# Patient Record
Sex: FEMALE | Race: WHITE | NOT HISPANIC OR LATINO | Employment: FULL TIME | URBAN - METROPOLITAN AREA
[De-identification: names, ages, dates, MRNs, and addresses within clinical notes are randomized per-mention and may not be internally consistent; named-entity substitution may affect disease eponyms.]

---
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
## 2022-05-23 ENCOUNTER — OFFICE VISIT (OUTPATIENT)
Dept: ORTHOPEDIC SURGERY | Facility: CLINIC | Age: 22
End: 2022-05-23

## 2022-05-23 VITALS — HEIGHT: 67 IN | BODY MASS INDEX: 32.02 KG/M2 | WEIGHT: 204 LBS

## 2022-05-23 DIAGNOSIS — M25.511 RIGHT SHOULDER PAIN, UNSPECIFIED CHRONICITY: Primary | ICD-10-CM

## 2022-05-23 DIAGNOSIS — M25.521 RIGHT ELBOW PAIN: ICD-10-CM

## 2022-05-23 PROCEDURE — 99203 OFFICE O/P NEW LOW 30 MIN: CPT | Performed by: ORTHOPAEDIC SURGERY

## 2022-05-23 NOTE — PROGRESS NOTES
"Chief Complaint  Initial Evaluation of the Right Shoulder and Initial Evaluation of the Right Elbow     Subjective      Anitra Garcia presents to [facility] for an evaluation of right shoulder and right elbow. Patient plays softball. She states pain in the shoulder and elbow when she throws. She states pain in the shoulder that radiates down the elbow. She states pain began in February. She hasn’t had this kind of pain before. No specific injury. Sometimes elbow pain and shoulder pain feels seperate.      Allergies   Allergen Reactions   • Vancomycin Itching and Swelling     Given vanc and zosyn June 2018, experienced subsequent skin redness and questionable hives suspicious for mild Megan's.   6/28/18 LS: Also had presumed septic shock at the time and has assoc hypotension.           Social History     Socioeconomic History   • Marital status: Single   Tobacco Use   • Smoking status: Never Smoker   • Smokeless tobacco: Never Used        Review of Systems     Objective   Vital Signs:   Ht 170.2 cm (67\")   Wt 92.5 kg (204 lb)   BMI 31.95 kg/m²       Physical Exam  Constitutional:       Appearance: Normal appearance. Patient is well-developed and normal weight.   HENT:      Head: Normocephalic.      Right Ear: Hearing and external ear normal.      Left Ear: Hearing and external ear normal.      Nose: Nose normal.   Eyes:      Conjunctiva/sclera: Conjunctivae normal.   Cardiovascular:      Rate and Rhythm: Normal rate.   Pulmonary:      Effort: Pulmonary effort is normal.      Breath sounds: No wheezing or rales.   Abdominal:      Palpations: Abdomen is soft.      Tenderness: There is no abdominal tenderness.   Musculoskeletal:      Cervical back: Normal range of motion.   Skin:     Findings: No rash.   Neurological:      Mental Status: Patient  is alert and oriented to person, place, and time.   Psychiatric:         Mood and Affect: Mood and affect normal.         Judgment: " Judgment normal.       Ortho Exam      RIGHT ELBOW AND SHOULDER: Intact wrist range of motion. Patient able to wiggle fingers and form a fist. Sensation grossly intact. Neurovascular intact.  Radial pulse 2+, ulnar pulse 2+. Full forward elevation. Abduction is full. IR to upper lumbar spine. No swelling. Full elbow flexion and extension. Some pain with apprehension testing.       Procedures        Imaging Results (Most Recent)     Procedure Component Value Units Date/Time    XR Scapula Right [[id number]] Resulted: 05/23/22 1613     Updated: 05/23/22 1614    XR Elbow 2 View Right [[id number]] Resulted: 05/23/22 1613     Updated: 05/23/22 1614           Result Review :     X-Ray Report:  Right shoulder(s) X-Ray  Indication: Evaluation of right shoulder pain   AP and Lateral view(s)  Findings: No acute fractures or dislocations.   Prior studies available for comparison: no     X-Ray Report:  Right elbow(s) X-Ray  Indication: Evaluation of right elbow   AP and Lateral view(s)  Findings: No acute osseous abnormalities.   Prior studies available for comparison: no     Assessment and Plan     Diagnoses and all orders for this visit:    1. Right shoulder pain, unspecified chronicity (Primary)  -     XR Scapula Right    2. Right elbow pain  -     XR Elbow 2 View Right        Discussed obtaining an Mri arthrogram of the right shoulder. She agrees with this plan.     Call or return if worsening symptoms.    Follow Up     After MRI      Patient was given instructions and counseling regarding her condition or for health maintenance advice. Please see specific information pulled into the AVS if appropriate.     Scribed for Marvin León MD by Florencia Perez.  05/23/22   16:10 EDT    I have personally performed the services described in this document as scribed by the above individual and it is both accurate and complete. Marvin León MD 05/23/22

## 2022-06-23 ENCOUNTER — HOSPITAL ENCOUNTER (OUTPATIENT)
Dept: MRI IMAGING | Facility: HOSPITAL | Age: 22
Discharge: HOME | End: 2022-06-23

## 2022-06-23 ENCOUNTER — HOSPITAL ENCOUNTER (OUTPATIENT)
Dept: INTERVENTIONAL RADIOLOGY/VASCULAR | Facility: HOSPITAL | Age: 22
Discharge: HOME | End: 2022-06-23

## 2022-06-23 DIAGNOSIS — M25.511 RIGHT SHOULDER PAIN, UNSPECIFIED CHRONICITY: ICD-10-CM

## 2022-06-23 PROCEDURE — 73040 CONTRAST X-RAY OF SHOULDER: CPT

## 2022-06-23 PROCEDURE — 0 GADOBENATE DIMEGLUMINE 529 MG/ML SOLUTION: Performed by: ORTHOPAEDIC SURGERY

## 2022-06-23 PROCEDURE — 25010000002 IOPAMIDOL 61 % SOLUTION: Performed by: ORTHOPAEDIC SURGERY

## 2022-06-23 PROCEDURE — 77002 NEEDLE LOCALIZATION BY XRAY: CPT

## 2022-06-23 PROCEDURE — 73222 MRI JOINT UPR EXTREM W/DYE: CPT

## 2022-06-23 PROCEDURE — A9577 INJ MULTIHANCE: HCPCS | Performed by: ORTHOPAEDIC SURGERY

## 2022-06-23 RX ORDER — LIDOCAINE HYDROCHLORIDE 20 MG/ML
20 INJECTION, SOLUTION INFILTRATION; PERINEURAL ONCE
Status: COMPLETED | OUTPATIENT
Start: 2022-06-23 | End: 2022-06-23

## 2022-06-23 RX ADMIN — IOPAMIDOL 5 ML: 612 INJECTION, SOLUTION INTRATHECAL at 09:50

## 2022-06-23 RX ADMIN — LIDOCAINE HYDROCHLORIDE 10 ML: 20 INJECTION, SOLUTION INFILTRATION; PERINEURAL at 09:50

## 2022-06-23 RX ADMIN — SODIUM BICARBONATE 1 ML: 84 INJECTION, SOLUTION INTRAVENOUS at 09:50

## 2022-06-23 RX ADMIN — GADOBENATE DIMEGLUMINE 0.1 ML: 529 INJECTION, SOLUTION INTRAVENOUS at 09:50

## 2022-06-27 ENCOUNTER — OFFICE VISIT (OUTPATIENT)
Dept: ORTHOPEDIC SURGERY | Facility: CLINIC | Age: 22
End: 2022-06-27

## 2022-06-27 VITALS — HEIGHT: 67 IN | OXYGEN SATURATION: 97 % | WEIGHT: 212.2 LBS | BODY MASS INDEX: 33.3 KG/M2 | HEART RATE: 76 BPM

## 2022-06-27 DIAGNOSIS — M75.101 TEAR OF RIGHT ROTATOR CUFF, UNSPECIFIED TEAR EXTENT, UNSPECIFIED WHETHER TRAUMATIC: ICD-10-CM

## 2022-06-27 DIAGNOSIS — M25.511 ACUTE PAIN OF RIGHT SHOULDER: Primary | ICD-10-CM

## 2022-06-27 PROCEDURE — 99213 OFFICE O/P EST LOW 20 MIN: CPT | Performed by: ORTHOPAEDIC SURGERY

## 2022-06-27 NOTE — PROGRESS NOTES
"Chief Complaint  Follow-up of the Right Shoulder     Subjective      Anitra Garcia presents to [facility] for follow up evaluation of the right shoulder. The patient recently had an MRI Arthrogram and is here today for those results. To review, Patient plays softball. She states pain in the shoulder and elbow when she throws. She states pain in the shoulder that radiates down the elbow. She states pain began in February. She hasn’t had this kind of pain before. No specific injury. Sometimes elbow pain and shoulder pain feels seperate.  She has been resting her shoulder.     Allergies   Allergen Reactions   • Vancomycin Itching and Swelling     Given vanc and zosyn June 2018, experienced subsequent skin redness and questionable hives suspicious for mild Megan's.   6/28/18 LS: Also had presumed septic shock at the time and has assoc hypotension.           Social History     Socioeconomic History   • Marital status: Single   Tobacco Use   • Smoking status: Never Smoker   • Smokeless tobacco: Never Used        Review of Systems     Objective   Vital Signs:   Pulse 76   Ht 170.2 cm (67\")   Wt 96.3 kg (212 lb 3.2 oz)   SpO2 97%   BMI 33.24 kg/m²       Physical Exam  Constitutional:       Appearance: Normal appearance. Patient is well-developed and normal weight.   HENT:      Head: Normocephalic.      Right Ear: Hearing and external ear normal.      Left Ear: Hearing and external ear normal.      Nose: Nose normal.   Eyes:      Conjunctiva/sclera: Conjunctivae normal.   Cardiovascular:      Rate and Rhythm: Normal rate.   Pulmonary:      Effort: Pulmonary effort is normal.      Breath sounds: No wheezing or rales.   Abdominal:      Palpations: Abdomen is soft.      Tenderness: There is no abdominal tenderness.   Musculoskeletal:      Cervical back: Normal range of motion.   Skin:     Findings: No rash.   Neurological:      Mental Status: Patient  is alert and oriented to person, " place, and time.   Psychiatric:         Mood and Affect: Mood and affect normal.         Judgment: Judgment normal.       Ortho Exam      RIGHT ELBOW AND SHOULDER: Intact wrist range of motion. Patient able to wiggle fingers and form a fist. Sensation grossly intact. Neurovascular intact.  Radial pulse 2+, ulnar pulse 2+. Full forward elevation. Abduction is full. IR to upper lumbar spine. No swelling. Full elbow flexion and extension. Some pain with apprehension testing      Procedures        Imaging Results (Most Recent)     None           Result Review :       MRI Shoulder Right Arthrogram    Result Date: 6/23/2022  Narrative: PROCEDURE: MRI SHOULDER RIGHT ARTHROGRAM  COMPARISON: None  INDICATIONS: ALL OVER RIGHT SHOULDER PAIN WITH LIMITED ROM. SOFTBALL INJURY.      TECHNIQUE: A variety of imaging planes and parameters were utilized for visualization of suspected pathology.  Images were performed after intra-articular injection of a mixture of non-ionic contrast and 0.1 cc of gadolinium contrast material.  The injection procedure is described in a separate report.   FINDINGS:  No fracture or malalignment is seen.  Marrow signal appears normal.  The acromioclavicular joint appears normal.  No AC joint effusion is seen.  A trace amount of fluid is noted in the subdeltoid/subacromial bursa.  A small amount of contrast is noted in the distal infra spinatus tendon at the footprint consistent with a small partial thickness articular surface tear.  There is a tiny partial thickness articular surface tear in the subscapularis tendon.  The rotator cuff otherwise appears unremarkable.  No muscle body atrophy is seen.  The biceps long head tendon and its attachment to the superior labrum are intact.  No labral tear is seen.  The glenohumeral ligaments are all intact.  No loose body is seen.  Cartilage in the glenohumeral joint is intact.      Impression:   1. Tiny articular surface tears of the distal infra spinatus and  subscapularis tendons     iRck Stoner M.D.       Electronically Signed and Approved By: Rick Stoner M.D. on 6/23/2022 at 13:44             FL Arthrogram Shoulder Right    Result Date: 6/23/2022  Narrative: PROCEDURE: FL ARTHROGRAM SHOULDER RIGHT  COMPARISON: None  INDICATIONS: RIGHT SHOULDER PAIN. 4 FLUORO IMAGES. 3mGy. FLUORO TIME 0.1 MINUTES. ISOVUE M 300-1CC. 0.9% SODIUM CHLORIDE/0.1 MULTIHANCE/ISOVUE M 300-14CC  CONSENT:   Prior to the procedure, the risks, benefits and alternatives were thoroughly explained.  This included the risk of bleeding, infection, and injury to associated structures.  Also the risk of allergic reaction was explained and the possibility of doing the procedure without intrarticular contrast.  The patient expressed understanding and wished to continue.  Informed written consent was obtained.   PROCEDURE:   The skin overlying the anterior aspect of the right shoulder joint was prepped and draped in normal sterile fashion.  Lidocaine was injected along the anticipated tract of the needle.  A 22 gauge spinal needle was advanced into the right glenohumeral joint under fluoroscopic guidance.  14 cc of mixture containing 13 cc sterile saline, 1 cc iodinated contrast and 0.1 cc gadolinium was injected.  The needle was withdrawn. The patient demonstrated no complication and was transferred to MRI.   Less than 0.1 minutes fluoroscopy time was utilized.       Impression:  Successful fluoroscopically guided injection of a dilute mixture of gadolinium into the right shoulder without evidence of complication.  Please refer to separate MRI report.   BETTY BETANCOURT MD       Electronically Signed and Approved By: BETTY BETANCOURT MD on 6/23/2022 at 11:08                      Assessment and Plan     Diagnoses and all orders for this visit:    1. Acute pain of right shoulder (Primary)    2. Tear of right rotator cuff, unspecified tear extent, unspecified whether traumatic        Discussed the  treatment plan with the patient.  I reviewed the MRI with the patient. Order for physical therapy given today. Plan to continue resting the shoulder.     Call or return if worsening symptoms.    Follow Up     6 weeks      Patient was given instructions and counseling regarding her condition or for health maintenance advice. Please see specific information pulled into the AVS if appropriate.     Scribed for Marvin León MD by Jennifer Brantley.  06/27/22   16:08 EDT      I have personally performed the services described in this document as scribed by the above individual and it is both accurate and complete. Marvin León MD 06/27/22

## 2022-09-13 ENCOUNTER — TELEPHONE (OUTPATIENT)
Dept: ORTHOPEDIC SURGERY | Facility: CLINIC | Age: 22
End: 2022-09-13

## 2022-09-13 DIAGNOSIS — M25.521 RIGHT ELBOW PAIN: ICD-10-CM

## 2022-09-13 DIAGNOSIS — M75.101 TEAR OF RIGHT ROTATOR CUFF, UNSPECIFIED TEAR EXTENT, UNSPECIFIED WHETHER TRAUMATIC: Primary | ICD-10-CM

## 2022-09-13 DIAGNOSIS — M25.511 ACUTE PAIN OF RIGHT SHOULDER: ICD-10-CM

## 2022-09-13 DIAGNOSIS — M25.511 RIGHT SHOULDER PAIN, UNSPECIFIED CHRONICITY: ICD-10-CM

## 2022-09-13 NOTE — TELEPHONE ENCOUNTER
Caller: PATIENT     Relationship: SELF     Best call back number: [phone]    What orders are you requesting (i.e. lab or imaging): PHYSICAL THERAPY OF RIGHT SHOULDER.     Where will you receive your lab/imaging services: [facility] [place] -FAX#- [phone]     Additional notes: PT. SAW DR. GUTIERREZ IN June FOR HER RIGHT SHOULDER.  SHE GOES TO SCHOOL IN [facility], AND WANTS TO DO HER PHYSICAL THERAPY [facility].  PLEASE FAX ORDER TO ABOVE NUMBER.   PLEASE CALL PT. TO LET HER KNOW.

## 2023-06-08 ENCOUNTER — TRANSCRIBE ORDERS (OUTPATIENT)
Dept: ADMINISTRATIVE | Facility: HOSPITAL | Age: 23
End: 2023-06-08
Payer: COMMERCIAL

## 2023-06-08 DIAGNOSIS — M75.21 BICIPITAL TENDINITIS OF RIGHT SHOULDER: Primary | ICD-10-CM

## 2025-07-22 ENCOUNTER — OFFICE VISIT (OUTPATIENT)
Dept: OBSTETRICS AND GYNECOLOGY | Age: 25
End: 2025-07-22
Payer: COMMERCIAL

## 2025-07-22 VITALS
WEIGHT: 233 LBS | SYSTOLIC BLOOD PRESSURE: 131 MMHG | HEIGHT: 67 IN | DIASTOLIC BLOOD PRESSURE: 95 MMHG | BODY MASS INDEX: 36.57 KG/M2 | HEART RATE: 91 BPM

## 2025-07-22 DIAGNOSIS — Z34.80 SUPERVISION OF OTHER NORMAL PREGNANCY, ANTEPARTUM: Primary | ICD-10-CM

## 2025-07-22 DIAGNOSIS — R03.0 ELEVATED BP WITHOUT DIAGNOSIS OF HYPERTENSION: ICD-10-CM

## 2025-07-22 PROBLEM — M75.101 TEAR OF RIGHT ROTATOR CUFF: Status: RESOLVED | Noted: 2022-06-27 | Resolved: 2025-07-22

## 2025-07-22 PROBLEM — M25.511 ACUTE PAIN OF RIGHT SHOULDER: Status: RESOLVED | Noted: 2022-06-27 | Resolved: 2025-07-22

## 2025-07-22 LAB
ABO GROUP BLD: NORMAL
AMPHET+METHAMPHET UR QL: NEGATIVE
AMPHETAMINES UR QL: NEGATIVE
B-HCG UR QL: POSITIVE
BARBITURATES UR QL SCN: NEGATIVE
BASOPHILS # BLD AUTO: 0.04 10*3/MM3 (ref 0–0.2)
BASOPHILS NFR BLD AUTO: 0.4 % (ref 0–1.5)
BENZODIAZ UR QL SCN: NEGATIVE
BLD GP AB SCN SERPL QL: NEGATIVE
BUPRENORPHINE SERPL-MCNC: NEGATIVE NG/ML
C TRACH DNA SPEC QL NAA+PROBE: NOT DETECTED
CANNABINOIDS SERPL QL: NEGATIVE
COCAINE UR QL: NEGATIVE
CREAT UR-MCNC: 144.1 MG/DL
DEPRECATED RDW RBC AUTO: 37.2 FL (ref 37–54)
EOSINOPHIL # BLD AUTO: 0.11 10*3/MM3 (ref 0–0.4)
EOSINOPHIL NFR BLD AUTO: 1 % (ref 0.3–6.2)
ERYTHROCYTE [DISTWIDTH] IN BLOOD BY AUTOMATED COUNT: 12.1 % (ref 12.3–15.4)
EXPIRATION DATE: ABNORMAL
FENTANYL UR-MCNC: NEGATIVE NG/ML
HBA1C MFR BLD: 5 % (ref 4.8–5.6)
HBV SURFACE AG SERPL QL IA: NORMAL
HCT VFR BLD AUTO: 39 % (ref 34–46.6)
HCV AB SER QL: NORMAL
HGB BLD-MCNC: 12.9 G/DL (ref 12–15.9)
HIV 1+2 AB+HIV1 P24 AG SERPL QL IA: NORMAL
IMM GRANULOCYTES # BLD AUTO: 0.04 10*3/MM3 (ref 0–0.05)
IMM GRANULOCYTES NFR BLD AUTO: 0.4 % (ref 0–0.5)
INTERNAL NEGATIVE CONTROL: ABNORMAL
INTERNAL POSITIVE CONTROL: ABNORMAL
LYMPHOCYTES # BLD AUTO: 2.73 10*3/MM3 (ref 0.7–3.1)
LYMPHOCYTES NFR BLD AUTO: 24 % (ref 19.6–45.3)
Lab: ABNORMAL
MCH RBC QN AUTO: 28.4 PG (ref 26.6–33)
MCHC RBC AUTO-ENTMCNC: 33.1 G/DL (ref 31.5–35.7)
MCV RBC AUTO: 85.7 FL (ref 79–97)
METHADONE UR QL SCN: NEGATIVE
MONOCYTES # BLD AUTO: 0.56 10*3/MM3 (ref 0.1–0.9)
MONOCYTES NFR BLD AUTO: 4.9 % (ref 5–12)
N GONORRHOEA RRNA SPEC QL NAA+PROBE: NOT DETECTED
NEUTROPHILS NFR BLD AUTO: 69.3 % (ref 42.7–76)
NEUTROPHILS NFR BLD AUTO: 7.91 10*3/MM3 (ref 1.7–7)
NRBC BLD AUTO-RTO: 0 /100 WBC (ref 0–0.2)
OPIATES UR QL: NEGATIVE
OXYCODONE UR QL SCN: NEGATIVE
PCP UR QL SCN: NEGATIVE
PLATELET # BLD AUTO: 259 10*3/MM3 (ref 140–450)
PMV BLD AUTO: 10.9 FL (ref 6–12)
PROT ?TM UR-MCNC: 10.7 MG/DL
PROT/CREAT UR: 0.07 MG/G{CREAT}
RBC # BLD AUTO: 4.55 10*6/MM3 (ref 3.77–5.28)
RH BLD: POSITIVE
TRICYCLICS UR QL SCN: NEGATIVE
WBC NRBC COR # BLD AUTO: 11.39 10*3/MM3 (ref 3.4–10.8)

## 2025-07-22 PROCEDURE — 87591 N.GONORRHOEAE DNA AMP PROB: CPT | Performed by: OBSTETRICS & GYNECOLOGY

## 2025-07-22 PROCEDURE — 80081 OBSTETRIC PANEL INC HIV TSTG: CPT | Performed by: OBSTETRICS & GYNECOLOGY

## 2025-07-22 PROCEDURE — 82570 ASSAY OF URINE CREATININE: CPT | Performed by: OBSTETRICS & GYNECOLOGY

## 2025-07-22 PROCEDURE — 80053 COMPREHEN METABOLIC PANEL: CPT | Performed by: OBSTETRICS & GYNECOLOGY

## 2025-07-22 PROCEDURE — 87086 URINE CULTURE/COLONY COUNT: CPT | Performed by: OBSTETRICS & GYNECOLOGY

## 2025-07-22 PROCEDURE — 80307 DRUG TEST PRSMV CHEM ANLYZR: CPT | Performed by: OBSTETRICS & GYNECOLOGY

## 2025-07-22 PROCEDURE — 86803 HEPATITIS C AB TEST: CPT | Performed by: OBSTETRICS & GYNECOLOGY

## 2025-07-22 PROCEDURE — 83020 HEMOGLOBIN ELECTROPHORESIS: CPT | Performed by: OBSTETRICS & GYNECOLOGY

## 2025-07-22 PROCEDURE — 83036 HEMOGLOBIN GLYCOSYLATED A1C: CPT | Performed by: OBSTETRICS & GYNECOLOGY

## 2025-07-22 PROCEDURE — 87491 CHLMYD TRACH DNA AMP PROBE: CPT | Performed by: OBSTETRICS & GYNECOLOGY

## 2025-07-22 PROCEDURE — 84156 ASSAY OF PROTEIN URINE: CPT | Performed by: OBSTETRICS & GYNECOLOGY

## 2025-07-22 RX ORDER — PRENATAL VIT/IRON FUM/FOLIC AC 27MG-0.8MG
TABLET ORAL DAILY
COMMUNITY

## 2025-07-22 NOTE — ASSESSMENT & PLAN NOTE
Elevated BP at new OB, repeat still had lower number in 90s  Per patient has white coat syndrome  Baseline labs obtained  Told pt to check BPS BID

## 2025-07-22 NOTE — PROGRESS NOTES
OBSTETRIC HISTORY AND PHYSICAL     Subjective:  Anitra Garcia is a 24 y.o.  at 11w5d  here for her new OB visit. Patient pregnancy is dated by LMP, limited bedside US today shows +FHTS and CRL around 10w6d. Official US ordered. Patient's pregnancy is complicated by nulliparity, possibly chronic HTN.   She is taking her prenatal vitamins.Reports no loss of fluid or vaginal bleeding.No hx of genetic, bleeding, endocrine, chromosome disorder in both patient and partner. No history of multiple gestations, congenital anomalies or mental retardation.    FOB involvement: yes   Pediatrician: undecided  Breast/Bottle: breast   Epidural: undecided   Discussed adequate water intake, food guidelines/weight gain, limit caffiene to less than 200mg daily.   Discussed food, activities to avoid. Discussed seatbelt safety.   Reviewed safe meds in pregnancy handout.  Taking PNV: yes   Smoking cessation needed: no     Reviewed and updated:  OBHx, GYNHx (STDs), PMHx, Medications, Allergies, PSHx, Social Hx, Preventative Hx (PAP), Hx of abuse/safe environment, Vaccine Hx including hx of chickenpox or vaccine, Genetic Hx (pt, FOB, both families).        OB History    Para Term  AB Living   1        SAB IAB Ectopic Molar Multiple Live Births              # Outcome Date GA Lbr Virgilio/2nd Weight Sex Type Anes PTL Lv   1 Current              History reviewed. No pertinent past medical history.  Past Surgical History:   Procedure Laterality Date    WISDOM TOOTH EXTRACTION  21     Family History   Problem Relation Age of Onset    Hypertension Father     Breast cancer Maternal Aunt     Diabetes Paternal Uncle     Uterine cancer Neg Hx     Ovarian cancer Neg Hx     Colon cancer Neg Hx      Allergies   Allergen Reactions    Vancomycin Itching and Swelling     Given vanc and zosyn 2018, experienced subsequent skin redness and questionable hives suspicious for mild Megan's.   18 LS: Also had presumed septic  shock at the time and has assoc hypotension.        Social History     Socioeconomic History    Marital status:    Tobacco Use    Smoking status: Never    Smokeless tobacco: Never   Vaping Use    Vaping status: Never Used   Substance and Sexual Activity    Alcohol use: Never    Drug use: Never    Sexual activity: Yes     Partners: Male     Birth control/protection: None           ROS:  General ROS: negative for - chills or fatigue  Respiratory ROS: negative for - cough or hemoptysis  Cardiovascular ROS: negative for - chest pain or dyspnea on exertion  Gastrointestinal ROS: negative for - abdominal pain or appetite loss  Musculoskeletal ROS: negative for - gait disturbance or joint pain  Neurological ROS: negative for - behavioral changes or bowel and bladder control changes  Dermatological ROS: negative for rashes or lesions     Objective:  Physical Exam:   Vitals:    07/22/25 0838   BP: 131/95   Pulse: 91       General appearance - alert, well appearing, and in no distress  Mental status - alert, oriented to person, place, and time  Neck- Supple.  No nodularity or enlargement.  Heart- Regular rate and rhythm without murmur, gallop or rub.  Lungs- Clear to auscultation bilaterally, negative W/R/R  Breasts- Deferred to annual  Abdomen- Soft, Gravid uterus, non-tender  Extremeties: Normal ROM, Negative swelling or cyanosis  Neurological: Gait normal, Negative tingling or loss of sensation     Pelvic exam: Decline, plan pap smear PP    Counseling:   Nutrition discussed, calories, activity/exercise in pregnancy  Discussed dietary restrictions/safety food preparation in pregnancy  Reviewed what to expect prenatal visits, office providers (female and male) and covering [facility]  Appropriate trimester precautions provided, N/V, vag bleeding, cramping  Questions answered  Discussed healthy weight gain.  Also discussed increased water intake, 200mg of caffeine daily, exercise and healthy eating habits.     Encouraged patient to consider breastfeeding. Discussed that it is recommended by the CDC and WHO that women exclusively breastfeed for the first 6 months and continue to breastfeed up to one year. Discussed the health benefits of breastfeeding, including increased immune systems in infants, reduced risk of food allergies, and reduced risk of chronic disease as an adult. Maternal benefits include more PP weight loss, reduced risk of PP depression, breast/ovarian cancer risk reduced.     ASSESSMENT/PLAN:   Diagnoses and all orders for this visit:    1. Supervision of other normal pregnancy, antepartum (Primary)  -     POC Pregnancy, Urine  -     OB Panel With HIV and RPR  -     Hemoglobinopathy Fractionation Cascade  -     Hemoglobin A1c  -     Chlamydia trachomatis, Neisseria gonorrhoeae, PCR - Urine, Urine, Random Void  -     Urine Culture - Urine, Urine, Clean Catch  -     Urine Drug Screen - Urine, Clean Catch  -     Yifan Panorama Prenatal Test: Chromosomes 13, 18, 21, X & Y: Triploidy 22Q.11.2 Deletion - Blood,  -     Yifan Horizon 2(CF & SMA) - Blood,  -     US Ob < 14 Weeks Single or First Gestation; Future  -     US Ob 14 + Weeks Single or First Gestation; Future    2. Elevated BP without diagnosis of hypertension  Assessment & Plan:  Elevated BP at new OB, repeat still had lower number in 90s  Per patient has white coat syndrome  Baseline labs obtained  Told pt to check BPS BID     Orders:  -     Comprehensive Metabolic Panel  -     Protein / Creatinine Ratio, Urine - Urine, Clean Catch        Problems Addressed this Visit       Supervision of other normal pregnancy, antepartum - Primary    Relevant Medications    Prenatal Vit-Fe Fumarate-FA (prenatal vitamin 27-0.8) 27-0.8 MG tablet tablet    Other Relevant Orders    POC Pregnancy, Urine (Completed)    OB Panel With HIV and RPR    Hemoglobinopathy Fractionation Cascade    Hemoglobin A1c    Chlamydia trachomatis, Neisseria gonorrhoeae, PCR - Urine,  Urine, Random Void    Urine Culture - Urine, Urine, Clean Catch    Urine Drug Screen - Urine, Clean Catch    Yifan Panorama Prenatal Test: Chromosomes 13, 18, 21, X & Y: Triploidy 22Q.11.2 Deletion - Blood,    Yifan Horizon 2(CF & SMA) - Blood,    US Ob < 14 Weeks Single or First Gestation    US Ob 14 + Weeks Single or First Gestation    Elevated BP without diagnosis of hypertension    Elevated BP at new OB, repeat still had lower number in 90s  Per patient has white coat syndrome  Baseline labs obtained  Told pt to check BPS BID           Other Visit Diagnoses         Elevated blood pressure reading        Relevant Orders    Comprehensive Metabolic Panel    Protein / Creatinine Ratio, Urine - Urine, Clean Catch          Diagnoses         Codes Comments      Supervision of other normal pregnancy, antepartum    -  Primary ICD-10-CM: Z34.80  ICD-9-CM: V22.1       Elevated blood pressure reading     ICD-10-CM: R03.0  ICD-9-CM: 796.2       Elevated BP without diagnosis of hypertension     ICD-10-CM: R03.0  ICD-9-CM: 796.2                     Return in about 4 weeks (around 8/19/2025) for Routine OB visit.    We have gone over the expected prenatal care to include the timing and content of visits including the anatomy ultrasound.  We discussed the content of the anatomy ultrasound and its limitations (the fact that ultrasound in general may not see up to 40% of abnormalities).  I informed her how to contact the office and/or on call person in the event of any problems and encouraged her to do so when she feels it is necessary.  We then spent time answering her questions which she indicated were answered to her satisfaction.    Shakila Martino DO  7/22/2025 09:28 EDT

## 2025-07-23 LAB
ALBUMIN SERPL-MCNC: 4.2 G/DL (ref 3.5–5.2)
ALBUMIN/GLOB SERPL: 1.5 G/DL
ALP SERPL-CCNC: 51 U/L (ref 39–117)
ALT SERPL W P-5'-P-CCNC: 16 U/L (ref 1–33)
ANION GAP SERPL CALCULATED.3IONS-SCNC: 13.6 MMOL/L (ref 5–15)
AST SERPL-CCNC: 19 U/L (ref 1–32)
BACTERIA SPEC AEROBE CULT: NORMAL
BILIRUB SERPL-MCNC: 0.3 MG/DL (ref 0–1.2)
BUN SERPL-MCNC: 9 MG/DL (ref 6–20)
BUN/CREAT SERPL: 16.7 (ref 7–25)
CALCIUM SPEC-SCNC: 9.3 MG/DL (ref 8.6–10.5)
CHLORIDE SERPL-SCNC: 105 MMOL/L (ref 98–107)
CO2 SERPL-SCNC: 18.4 MMOL/L (ref 22–29)
CREAT SERPL-MCNC: 0.54 MG/DL (ref 0.57–1)
EGFRCR SERPLBLD CKD-EPI 2021: 132.9 ML/MIN/1.73
GLOBULIN UR ELPH-MCNC: 2.8 GM/DL
GLUCOSE SERPL-MCNC: 72 MG/DL (ref 65–99)
POTASSIUM SERPL-SCNC: 3.9 MMOL/L (ref 3.5–5.2)
PROT SERPL-MCNC: 7 G/DL (ref 6–8.5)
RPR SER QL: NORMAL
SODIUM SERPL-SCNC: 137 MMOL/L (ref 136–145)

## 2025-07-24 LAB
HGB A MFR BLD ELPH: 97 % (ref 96.4–98.8)
HGB A2 MFR BLD ELPH: 3 % (ref 1.8–3.2)
HGB F MFR BLD ELPH: 0 % (ref 0–2)
HGB FRACT BLD-IMP: NORMAL
HGB S MFR BLD ELPH: 0 %
RUBV IGG SERPL IA-ACNC: 1.97 INDEX

## 2025-07-30 LAB
Lab: NEGATIVE
Lab: NORMAL
Lab: NORMAL
NTRA CYSTIC FIBROSIS: NEGATIVE
NTRA SPINAL MUSCULAR ATROPHY: NEGATIVE

## 2025-08-20 ENCOUNTER — ROUTINE PRENATAL (OUTPATIENT)
Dept: OBSTETRICS AND GYNECOLOGY | Age: 25
End: 2025-08-20
Payer: COMMERCIAL

## 2025-08-20 VITALS — WEIGHT: 229 LBS | SYSTOLIC BLOOD PRESSURE: 134 MMHG | DIASTOLIC BLOOD PRESSURE: 89 MMHG | BODY MASS INDEX: 35.87 KG/M2

## 2025-08-20 DIAGNOSIS — Z34.80 SUPERVISION OF OTHER NORMAL PREGNANCY, ANTEPARTUM: Primary | ICD-10-CM

## 2025-08-20 LAB
GLUCOSE UR STRIP-MCNC: NEGATIVE MG/DL
PROT UR STRIP-MCNC: NEGATIVE MG/DL